# Patient Record
Sex: MALE | Race: WHITE
[De-identification: names, ages, dates, MRNs, and addresses within clinical notes are randomized per-mention and may not be internally consistent; named-entity substitution may affect disease eponyms.]

---

## 2018-09-14 ENCOUNTER — HOSPITAL ENCOUNTER (EMERGENCY)
Dept: HOSPITAL 7 - FB.ED | Age: 43
Discharge: HOME | End: 2018-09-14
Payer: COMMERCIAL

## 2018-09-14 DIAGNOSIS — Z79.899: ICD-10-CM

## 2018-09-14 DIAGNOSIS — J98.01: Primary | ICD-10-CM

## 2018-09-14 DIAGNOSIS — F17.210: ICD-10-CM

## 2018-09-14 RX ADMIN — ALBUTEROL SULFATE ONE MG: 2.5 SOLUTION RESPIRATORY (INHALATION) at 18:53

## 2018-09-19 NOTE — CR
INDICATION:   Shortness of breath.



CHEST, PA AND LATERAL VIEWS:



FINDINGS:  No comparison studies are available.  



The lungs are hyperinflated.  The patient appears to have underlying emphysema.
  



There is some asymmetry that is identified in the appearance of the right 
suprahilar chest, compared to the left side.  This may simply represent normal 
bronchovascular structures.  It is possible that there is a small area of 
pneumonia or atelectasis.  I cannot exclude that there is not an underlying 
mass or adenopathy in this location, however.  I would suggest a CT scan chest 
with IV contrast to further evaluate.



No other definite nodule or mass. 



The heart size and pulmonary vasculature are normal.  



There is a mild scoliosis deformity of the thoracic spine, curved convex right, 
with some degenerative end plate change and spurring.



IMPRESSION:  Almost certain underlying emphysema.



Asymmetry right suprahilar lung, compared to left side.  Again, this may turn 
out to be normal.  I still would do a CT scan of the chest to further evaluate, 
however.  

MTDD

## 2020-01-25 ENCOUNTER — HOSPITAL ENCOUNTER (OUTPATIENT)
Dept: HOSPITAL 52 - LL.ED | Age: 45
Setting detail: OBSERVATION
LOS: 2 days | Discharge: HOME | End: 2020-01-27
Attending: EMERGENCY MEDICINE | Admitting: EMERGENCY MEDICINE
Payer: MEDICAID

## 2020-01-25 DIAGNOSIS — M54.9: ICD-10-CM

## 2020-01-25 DIAGNOSIS — F17.210: ICD-10-CM

## 2020-01-25 DIAGNOSIS — J44.9: ICD-10-CM

## 2020-01-25 DIAGNOSIS — M62.830: Primary | ICD-10-CM

## 2020-01-25 DIAGNOSIS — G89.29: ICD-10-CM

## 2020-01-25 LAB
CHLORIDE SERPL-SCNC: 103 MMOL/L (ref 98–107)
SODIUM SERPL-SCNC: 140 MMOL/L (ref 136–145)

## 2020-01-25 PROCEDURE — G0378 HOSPITAL OBSERVATION PER HR: HCPCS

## 2020-01-25 RX ADMIN — Medication PRN ML: at 23:03

## 2020-01-25 RX ADMIN — Medication PRN ML: at 22:25

## 2020-01-25 NOTE — EDM.PDOC
ED HPI GENERAL MEDICAL PROBLEM





- General


Chief Complaint: Back Pain or Injury


Stated Complaint: flank, abdominal pain


Time Seen by Provider: 01/25/20 21:43


Source of Information: Reports: Patient


History Limitations: Reports: No Limitations





- History of Present Illness


INITIAL COMMENTS - FREE TEXT/NARRATIVE: 





Patient comes to ER with acute back pain/muscle spasms that started yesterday. 

Worsening over the last 24 hours.  Was performing "heavy wrenching" on tractor 

yesterday and feels that may have triggered this pain.  Has had pain in same 

area (left side/under scapula and mid back, radiating around to front of upper 

abdomen) in past but it was located a little higher than this episode.  Has 

history of chronic back pain/intermittent leg numbness that has been worked up 

in past by other providers. No new numbness/arm weakness. 





- Related Data


 Allergies











Allergy/AdvReac Type Severity Reaction Status Date / Time


 


No Known Allergies Allergy   Verified 01/25/20 21:08











Home Meds: 


 Home Meds





Albuterol [Ventolin HFA] 0 puff INH Q4H PRN 01/25/20 [History]











Past Medical History


Respiratory History: Reports: COPD


Musculoskeletal History: Reports: Back Pain, Chronic


Neurological History: Reports: Neuropathy, Peripheral





Social & Family History





- Family History


Family Medical History: Noncontributory





- Tobacco Use


Smoking Status *Q: Current Every Day Smoker


Years of Tobacco use: 28


Packs/Tins Daily: 0.5


Second Hand Smoke Exposure: Yes





- Caffeine Use


Caffeine Use: Reports: Coffee, Soda


Other Caffeine Use: Mt Dew


Caffeine Use Comment: 2L day





- Alcohol Use


Days Per Week of Alcohol Use: 0





- Recreational Drug Use


Recreational Drug Use: No





ED ROS GENERAL





- Review of Systems


Review Of Systems: See Below


Constitutional: Reports: No Symptoms


HEENT: Reports: No Symptoms


Respiratory: Reports: No Symptoms.  Denies: Pleuritic Chest Pain


Cardiovascular: Denies: Chest Pain, Dyspnea on Exertion, Lightheadedness, 

Palpitations


GI/Abdominal: Reports: Other (back pain does wrap around to left upper abdomen)

.  Denies: Constipation, Diarrhea, Nausea, Vomiting


: Reports: No Symptoms


Musculoskeletal: Reports: Back Pain.  Denies: Arm Pain, Leg Pain


Skin: Reports: No Symptoms


Neurological: Reports: No Symptoms (no acute changes)


Psychiatric: Reports: No Symptoms


Hematologic/Lymphatic: Reports: No Symptoms





ED EXAM, GENERAL





- Physical Exam


Exam: See Below


Exam Limited By: Physical Impairment (severe back spasms with movement/

palpation of left mid back.)


General Appearance: Alert, WD/WN, Severe Distress (during spasms)


Eye Exam: Bilateral Eye: EOMI, PERRL


Ears: Hearing Grossly Normal


Nose: No: Nasal Deformity, Nasal Swelling, Nasal Drainage


Throat/Mouth: Normal Lips, Normal Voice, No Airway Compromise


Head: Atraumatic, Normocephalic


Neck: Supple, Non-Tender, Full Range of Motion


Respiratory/Chest: No Respiratory Distress, Lungs Clear, Normal Breath Sounds, 

No Accessory Muscle Use, Other (posterior chest under scapula tender with 

palpation, tender along serratus muscle)


Cardiovascular: No Edema, No Murmur, Tachycardia


GI/Abdominal: Normal Bowel Sounds, Soft, Non-Tender, No Distention


 (Male) Exam: Deferred


Rectal (Males) Exam: Deferred


Back Exam: Paraspinal Tenderness (left back under scapula)


Extremities: Normal Capillary Refill, Other (no gross assymetry noted. )


Neurological: Alert, Oriented, Normal Cognition, Other (appears to have equal 

strength bilaterally upper/lower limbs)


Psychiatric: Anxious


Skin Exam: Warm, Dry, Intact, Normal Color





Course





- Vital Signs


Last Recorded V/S: 





 Last Vital Signs











Temp  36.8 C   01/25/20 21:11


 


Pulse  107 H  01/25/20 21:11


 


Resp  20   01/25/20 21:11


 


BP  131/87   01/25/20 21:11


 


Pulse Ox  96   01/25/20 21:11














- Orders/Labs/Meds


Orders: 





 Active Orders 24 hr











 Category Date Time Status


 


 MAGNESIUM [CHEM] Stat Lab  01/25/20 21:30 Received


 


 Ketorolac [Toradol] Med  01/25/20 21:54 Once





 30 mg IVPUSH ONETIME ONE   


 


 Morphine Med  01/25/20 21:54 Once





 2 mg IVPUSH ONETIME ONE   


 


 Ondansetron [Zofran] Med  01/25/20 21:54 Once





 4 mg IVPUSH ONETIME ONE   


 


 Sodium Chloride 0.9% [Saline Flush] Med  01/25/20 21:53 Ordered





 10 ml FLUSH ASDIRECTED PRN   


 


 Saline Lock Insert [OM.PC] Routine Oth  01/25/20 21:53 Ordered








 Medication Orders





Sodium Chloride (Saline Flush)  10 ml FLUSH ASDIRECTED PRN


   PRN Reason: Keep Vein Open








Labs: 





 Laboratory Tests











  01/25/20 01/25/20 01/25/20 Range/Units





  21:30 21:30 21:30 


 


WBC   10.0   (4.0-10.2)  K/uL


 


RBC   5.21   (4.33-5.41)  M/uL


 


Hgb   15.7   (13.1-16.8)  g/dL


 


Hct   45.6   (39.0-49.0)  %


 


MCV   87.5   (84.0-98.0)  fL


 


MCH   30.1   (28.2-33.3)  pg


 


MCHC   34.4   (31.7-36.0)  g/dL


 


RDW   13.0   (11.2-14.1)  %


 


Plt Count   279   (150-350)  K/uL


 


Neut % (Auto)   51.2   (45.0-80.0)  %


 


Lymph % (Auto)   36.2   (10.0-50.0)  %


 


Mono % (Auto)   8.3   (2.0-14.0)  %


 


Eos % (Auto)   3.9   (0.0-5.0)  %


 


Baso % (Auto)   0.4   (0.0-2.0)  %


 


Neut # (Auto)   5.14   (1.40-7.00)  K/uL


 


Lymph # (Auto)   3.63 H   (0.50-3.50)  K/uL


 


Mono # (Auto)   0.83   (0.00-1.00)  K/uL


 


Eos # (Auto)   0.39   (0.00-0.50)  K/uL


 


Baso # (Auto)   0.04   (0.00-0.20)  K/uL


 


Sodium    140  (136-145)  mmol/L


 


Potassium    4.1  (3.5-5.1)  mmol/L


 


Chloride    103  ()  mmol/L


 


Carbon Dioxide    27.2  (21.0-32.0)  mmol/L


 


BUN    15  (7-18)  mg/dL


 


Creatinine    0.93  (0.51-1.17)  mg/dL


 


Est Cr Clr Drug Dosing    102.48  mL/min


 


Estimated GFR (MDRD)    > 60  mL/min


 


Glucose    127 H  ()  mg/dL


 


Calcium    9.2  (8.5-10.1)  mg/dL


 


Total Bilirubin    0.6  (0.2-1.0)  mg/dL


 


AST    24  (15-37)  U/L


 


ALT    33  (12-78)  U/L


 


Alkaline Phosphatase    81  ()  IU/L


 


Total Protein    8.0  (6.4-8.2)  g/dL


 


Albumin    4.3  (3.4-5.0)  g/dL


 


Specimen Type  Urinvoid    


 


Urine Color  Yellow    


 


Urine Appearance  Clear    


 


Urine pH  5.5    (5.0-9.0)  


 


Ur Specific Gravity  >= 1.030    (1.005-1.030)  


 


Urine Protein  Negative    (NEGATIVE)  mg/dL


 


Urine Glucose (UA)  Negative    (NEGATIVE)  mg/dL


 


Urine Ketones  Trace H    (NEGATIVE)  mg/dL


 


Urine Occult Blood  Negative    (NEGATIVE)  


 


Urine Nitrite  Negative    (NEGATIVE)  


 


Urine Bilirubin  Negative    (NEGATIVE)  


 


Urine Urobilinogen  0.2    (0.2-1.0)  E.U./dL


 


Ur Leukocyte Esterase  Negative    (NEGATIVE)  


 


Urine RBC  Not seen    /HPF


 


Urine WBC  0-5    /HPF


 


Urine Bacteria  Rare    (NONE TO FEW)  /HPF


 


Urine Mucus  Rare H    (NEGATIVE)  /LPF











Meds: 





Medications











Generic Name Dose Route Start Last Admin





  Trade Name Freq  PRN Reason Stop Dose Admin


 


Sodium Chloride  10 ml  01/25/20 21:53  





  Saline Flush  FLUSH   





  ASDIRECTED PRN   





  Keep Vein Open   





     





     





     














Discontinued Medications














Generic Name Dose Route Start Last Admin





  Trade Name Freq  PRN Reason Stop Dose Admin


 


Diazepam  5 mg  01/25/20 21:53  





  Valium.  PO  01/25/20 21:54  





  ONETIME ONE   





     





     





     





     














- Re-Assessments/Exams


Free Text/Narrative Re-Assessment/Exam: 





01/25/20 22:02


CBC/Chem/Mg/UA requested.  Overall unremarkable labs.  Does not appear to be 

consistent with kidney stone at this time.  Does appear to be musculo/skeletal 

in nature given reproducibility with light palpation and spasm-like nature.  

Patient does have history of long standing back issues.  Unable to obtain plain 

films of back at this time due to patient's symptoms. Plan is to admit 

observation for pain and spasm control and assistance with ADLs.





Departure





- Departure


Time of Disposition: 22:05


Disposition: Refer to Observation


Condition: Good


Clinical Impression: 


 Spasm of thoracic back muscle








- Discharge Information


*PRESCRIPTION DRUG MONITORING PROGRAM REVIEWED*: Not Applicable


*COPY OF PRESCRIPTION DRUG MONITORING REPORT IN PATIENT CARMINE: Not Applicable





Sepsis Event Note





- Evaluation


Sepsis Screening Result: No Definite Risk





- Focused Exam


Vital Signs: 





 Vital Signs











  Temp Pulse Resp BP Pulse Ox


 


 01/25/20 21:11  36.8 C  107 H  20  131/87  96











Date Exam was Performed: 01/25/20


Time Exam was Performed: 21:54





- Problem List & Annotations


(1) Spasm of thoracic back muscle


SNOMED Code(s): 897745113904870


   Code(s): M62.830 - MUSCLE SPASM OF BACK   Status: Acute   Priority: High   

Current Visit: Yes   Onset Date: 01/24/20   Annotation/Comment:: Acute spasm of 

back/lower thoracic area with radiation around serratus anterior muscle and 

left upper abdomen.  Will treat pain and spasms. Assistance with ADLs as 

needed. Consider MRI study when it is available on Monday depending on patient'

s clinic course over the next 24-48 hours.    





(2) Chronic back pain


SNOMED Code(s): 281711044


   Code(s): M54.9 - DORSALGIA, UNSPECIFIED; G89.29 - OTHER CHRONIC PAIN   Status

: Chronic   Priority: Low   Current Visit: No   Annotation/Comment:: stable per 

patient   


Qualifiers: 


   Back pain location: low back pain   Back pain laterality: unspecified   

Sciatica presence: unspecified whether sciatica present   Qualified Code(s): 

M54.5 - Low back pain; G89.29 - Other chronic pain   





(3) COPD (chronic obstructive pulmonary disease)


SNOMED Code(s): 35932589


   Code(s): J44.9 - CHRONIC OBSTRUCTIVE PULMONARY DISEASE, UNSPECIFIED   Status

: Chronic   Priority: Low   Current Visit: No   Annotation/Comment:: mild 

asthma per patient with PRN Albuterol inhaler.  Is smoker.    


Qualifiers: 


   COPD type: unspecified COPD   Qualified Code(s): J44.9 - Chronic obstructive 

pulmonary disease, unspecified   





- Problem List Review


Problem List Initiated/Reviewed/Updated: Yes





- My Orders


Last 24 Hours: 





My Active Orders





01/25/20 21:30


MAGNESIUM [CHEM] Stat 





01/25/20 21:53


Sodium Chloride 0.9% [Saline Flush]   10 ml FLUSH ASDIRECTED PRN 


Saline Lock Insert [OM.PC] Routine 





01/25/20 21:54


Ketorolac [Toradol]   30 mg IVPUSH ONETIME ONE 


Morphine   2 mg IVPUSH ONETIME ONE 


Ondansetron [Zofran]   4 mg IVPUSH ONETIME ONE 














- Assessment/Plan


Admission H&P: Please use this note as an admission H&P


Last 24 Hours: 





My Active Orders





01/25/20 21:30


MAGNESIUM [CHEM] Stat 





01/25/20 21:53


Sodium Chloride 0.9% [Saline Flush]   10 ml FLUSH ASDIRECTED PRN 


Saline Lock Insert [OM.PC] Routine 





01/25/20 21:54


Ketorolac [Toradol]   30 mg IVPUSH ONETIME ONE 


Morphine   2 mg IVPUSH ONETIME ONE 


Ondansetron [Zofran]   4 mg IVPUSH ONETIME ONE 











Assessment:: 





as above


Plan: 





as above

## 2020-01-26 RX ADMIN — KETOROLAC TROMETHAMINE PRN MG: 30 INJECTION, SOLUTION INTRAMUSCULAR at 08:50

## 2020-01-26 RX ADMIN — Medication SCH EACH: at 11:29

## 2020-01-26 NOTE — PCM.PN
- General Info


Date of Service: 01/26/20


Admission Dx/Problem (Free Text): 





Acute left sided back pain/spasms that started while patient was working on 

tractor at work. 


Subjective Update: 





Pain severity/spasms still present but overall severity has improved on current 

medical therapy. Still needs assistance with getting up and out of bed.  Unable 

to perform ADLs independently. 


Functional Status: Reports: Tolerating Diet, Ambulating (with assistance).  

Denies: New Symptoms


Pain Score: 3





- Review of Systems


General: Reports: No Symptoms


HEENT: Reports: No Symptoms


Pulmonary: Denies: Shortness of Breath, Pleuritic Chest Pain, Cough, Sputum


Cardiovascular: Reports: Chest Pain (left sided, wraps from back around to 

front.).  Denies: Palpitations, Dyspnea on Exertion, Orthopnea, Lightheadedness


Gastrointestinal: Reports: Abdominal Pain (Pain extends to upper left side 

abdomen when he experiences spasms).  Denies: Constipation, Diarrhea, Nausea, 

Vomiting


Genitourinary: Reports: No Symptoms


Musculoskeletal: Reports: Back Pain.  Denies: Neck Pain, Arm Pain, Leg Pain


Skin: Reports: No Symptoms


Neurological: Denies: Confusion, Dizziness, Numbness, Paresthesia, Weakness


Psychiatric: Reports: No Symptoms





- Patient Data


Vitals - Most Recent: 


 Last Vital Signs











Temp  36.9 C   01/26/20 10:52


 


Pulse  98   01/26/20 10:52


 


Resp  18   01/26/20 10:52


 


BP  113/64   01/26/20 10:52


 


Pulse Ox  93 L  01/26/20 10:52











Weight - Most Recent: 77.111 kg


I&O - Last 24 Hours: 


 Intake & Output











 01/25/20 01/26/20 01/26/20





 22:59 06:59 14:59


 


Intake Total  850 


 


Balance  850 











Lab Results Last 24 Hours: 


 Laboratory Results - last 24 hr











  01/25/20 01/25/20 01/25/20 Range/Units





  21:30 21:30 21:30 


 


WBC   10.0   (4.0-10.2)  K/uL


 


RBC   5.21   (4.33-5.41)  M/uL


 


Hgb   15.7   (13.1-16.8)  g/dL


 


Hct   45.6   (39.0-49.0)  %


 


MCV   87.5   (84.0-98.0)  fL


 


MCH   30.1   (28.2-33.3)  pg


 


MCHC   34.4   (31.7-36.0)  g/dL


 


RDW   13.0   (11.2-14.1)  %


 


Plt Count   279   (150-350)  K/uL


 


Neut % (Auto)   51.2   (45.0-80.0)  %


 


Lymph % (Auto)   36.2   (10.0-50.0)  %


 


Mono % (Auto)   8.3   (2.0-14.0)  %


 


Eos % (Auto)   3.9   (0.0-5.0)  %


 


Baso % (Auto)   0.4   (0.0-2.0)  %


 


Neut # (Auto)   5.14   (1.40-7.00)  K/uL


 


Lymph # (Auto)   3.63 H   (0.50-3.50)  K/uL


 


Mono # (Auto)   0.83   (0.00-1.00)  K/uL


 


Eos # (Auto)   0.39   (0.00-0.50)  K/uL


 


Baso # (Auto)   0.04   (0.00-0.20)  K/uL


 


Sodium    140  (136-145)  mmol/L


 


Potassium    4.1  (3.5-5.1)  mmol/L


 


Chloride    103  ()  mmol/L


 


Carbon Dioxide    27.2  (21.0-32.0)  mmol/L


 


BUN    15  (7-18)  mg/dL


 


Creatinine    0.93  (0.51-1.17)  mg/dL


 


Est Cr Clr Drug Dosing    102.48  mL/min


 


Estimated GFR (MDRD)    > 60  mL/min


 


Glucose    127 H  ()  mg/dL


 


Calcium    9.2  (8.5-10.1)  mg/dL


 


Magnesium     (1.8-2.4)  mg/dL


 


Total Bilirubin    0.6  (0.2-1.0)  mg/dL


 


AST    24  (15-37)  U/L


 


ALT    33  (12-78)  U/L


 


Alkaline Phosphatase    81  ()  IU/L


 


Total Protein    8.0  (6.4-8.2)  g/dL


 


Albumin    4.3  (3.4-5.0)  g/dL


 


Specimen Type  Urinvoid    


 


Urine Color  Yellow    


 


Urine Appearance  Clear    


 


Urine pH  5.5    (5.0-9.0)  


 


Ur Specific Gravity  >= 1.030    (1.005-1.030)  


 


Urine Protein  Negative    (NEGATIVE)  mg/dL


 


Urine Glucose (UA)  Negative    (NEGATIVE)  mg/dL


 


Urine Ketones  Trace H    (NEGATIVE)  mg/dL


 


Urine Occult Blood  Negative    (NEGATIVE)  


 


Urine Nitrite  Negative    (NEGATIVE)  


 


Urine Bilirubin  Negative    (NEGATIVE)  


 


Urine Urobilinogen  0.2    (0.2-1.0)  E.U./dL


 


Ur Leukocyte Esterase  Negative    (NEGATIVE)  


 


Urine RBC  Not seen    /HPF


 


Urine WBC  0-5    /HPF


 


Urine Bacteria  Rare    (NONE TO FEW)  /HPF


 


Urine Mucus  Rare H    (NEGATIVE)  /LPF














  01/25/20 Range/Units





  21:30 


 


WBC   (4.0-10.2)  K/uL


 


RBC   (4.33-5.41)  M/uL


 


Hgb   (13.1-16.8)  g/dL


 


Hct   (39.0-49.0)  %


 


MCV   (84.0-98.0)  fL


 


MCH   (28.2-33.3)  pg


 


MCHC   (31.7-36.0)  g/dL


 


RDW   (11.2-14.1)  %


 


Plt Count   (150-350)  K/uL


 


Neut % (Auto)   (45.0-80.0)  %


 


Lymph % (Auto)   (10.0-50.0)  %


 


Mono % (Auto)   (2.0-14.0)  %


 


Eos % (Auto)   (0.0-5.0)  %


 


Baso % (Auto)   (0.0-2.0)  %


 


Neut # (Auto)   (1.40-7.00)  K/uL


 


Lymph # (Auto)   (0.50-3.50)  K/uL


 


Mono # (Auto)   (0.00-1.00)  K/uL


 


Eos # (Auto)   (0.00-0.50)  K/uL


 


Baso # (Auto)   (0.00-0.20)  K/uL


 


Sodium   (136-145)  mmol/L


 


Potassium   (3.5-5.1)  mmol/L


 


Chloride   ()  mmol/L


 


Carbon Dioxide   (21.0-32.0)  mmol/L


 


BUN   (7-18)  mg/dL


 


Creatinine   (0.51-1.17)  mg/dL


 


Est Cr Clr Drug Dosing   mL/min


 


Estimated GFR (MDRD)   mL/min


 


Glucose   ()  mg/dL


 


Calcium   (8.5-10.1)  mg/dL


 


Magnesium  1.9  (1.8-2.4)  mg/dL


 


Total Bilirubin   (0.2-1.0)  mg/dL


 


AST   (15-37)  U/L


 


ALT   (12-78)  U/L


 


Alkaline Phosphatase   ()  IU/L


 


Total Protein   (6.4-8.2)  g/dL


 


Albumin   (3.4-5.0)  g/dL


 


Specimen Type   


 


Urine Color   


 


Urine Appearance   


 


Urine pH   (5.0-9.0)  


 


Ur Specific Gravity   (1.005-1.030)  


 


Urine Protein   (NEGATIVE)  mg/dL


 


Urine Glucose (UA)   (NEGATIVE)  mg/dL


 


Urine Ketones   (NEGATIVE)  mg/dL


 


Urine Occult Blood   (NEGATIVE)  


 


Urine Nitrite   (NEGATIVE)  


 


Urine Bilirubin   (NEGATIVE)  


 


Urine Urobilinogen   (0.2-1.0)  E.U./dL


 


Ur Leukocyte Esterase   (NEGATIVE)  


 


Urine RBC   /HPF


 


Urine WBC   /HPF


 


Urine Bacteria   (NONE TO FEW)  /HPF


 


Urine Mucus   (NEGATIVE)  /LPF











Med Orders - Current: 


 Current Medications





Acetaminophen (Tylenol Extra Strength)  1,000 mg PO Q6H PRN


   PRN Reason: Pain


Albuterol (Proventil Neb Soln)  2.5 mg NEB Q6HRRT PRN


   PRN Reason: Shortness Of Breath/wheezing


   Last Admin: 01/26/20 10:46 Dose:  2.5 mg


Sodium Chloride (Normal Saline)  1,000 mls @ 125 mls/hr IV ASDIRECTED [facility]


   Last Admin: 01/26/20 08:05 Dose:  125 mls/hr


Ketorolac Tromethamine (Toradol)  30 mg IVPUSH Q6H PRN


   PRN Reason: Pain (moderate 4-6)


   Last Admin: 01/26/20 08:50 Dose:  30 mg


Lidocaine (Aspercreme 4%)  1 each TOP DAILY [facility]


Morphine Sulfate (Morphine)  2 mg IVPUSH Q1H PRN


   PRN Reason: Pain (severe 7-10)


Nicotine (Habitrol)  21 mg TRDERM DAILY [facility]


   Last Admin: 01/26/20 10:02 Dose:  21 mg


Ondansetron HCl (Zofran)  4 mg IVPUSH Q6H PRN


   PRN Reason: Nausea/Vomiting


Sodium Chloride (Saline Flush)  10 ml FLUSH ASDIRECTED PRN


   PRN Reason: Keep Vein Open


   Last Admin: 01/25/20 23:03 Dose:  10 ml





Discontinued Medications





Diazepam (Valium.)  5 mg PO ONETIME ONE


   Stop: 01/25/20 21:54


   Last Admin: 01/25/20 22:00 Dose:  5 mg


Diazepam (Valium.)  5 mg PO ONETIME ONE


   Stop: 01/26/20 08:01


   Last Admin: 01/26/20 08:05 Dose:  5 mg


Magnesium Sulfate/Dextrose 1 (gm/ Premix)  100 mls @ 100 mls/hr IV ONETIME ONE


   Stop: 01/25/20 23:15


   Last Admin: 01/25/20 23:02 Dose:  100 mls/hr


Ketorolac Tromethamine (Toradol)  30 mg IVPUSH ONETIME ONE


   Stop: 01/25/20 21:55


   Last Admin: 01/25/20 22:12 Dose:  30 mg


Methylprednisolone Sodium Succinate (Solu-Medrol)  125 mg IVPUSH ONETIME ONE


   Stop: 01/25/20 22:16


   Last Admin: 01/25/20 23:02 Dose:  125 mg


Morphine Sulfate (Morphine)  2 mg IVPUSH ONETIME ONE


   Stop: 01/25/20 21:55


   Last Admin: 01/25/20 22:08 Dose:  2 mg


Ondansetron HCl (Zofran)  4 mg IVPUSH ONETIME ONE


   Stop: 01/25/20 21:55


   Last Admin: 01/25/20 22:26 Dose:  4 mg











- Exam


Quality Assessment: DVT Prophylaxis


General: Alert, Oriented, Cooperative, Moderate Distress (when spasms triggered)


HEENT: Pupils Equal, Pupils Reactive, EOMI, Mucous Membr. Moist/Pink


Neck: Supple


Lungs: Clear to Auscultation, Normal Respiratory Effort


Cardiovascular: Regular Rate, Regular Rhythm


GI/Abdominal Exam: Normal Bowel Sounds, Soft, Non-Tender, No Distention


 (Male) Exam: Deferred


Back Exam: Other (very tender with even lightest touch mid left back/serratus 

muscle distribution/around side and left lower anterior chest. Reproduces pain 

complaint.  No spinal tenderness with palpation)


Extremities: Normal Capillary Refill, Limited Range of Motion (left arm/

shoulder as it exacerbates pain complaint)


Peripheral Pulses: 2+: Radial (L), Radial (R)


Skin: Warm, Dry.  No: Rash


Neurological: No New Focal Deficit


Psy/Mental Status: Alert, Normal Affect, Normal Mood





Sepsis Event Note





- Evaluation


Sepsis Screening Result: No Definite Risk





- Focused Exam


Vital Signs: 


 Vital Signs











  Temp Temp Pulse Resp BP Pulse Ox


 


 01/26/20 10:52  36.9 C   98  18  113/64  93 L


 


 01/26/20 08:00   36.7 C  88  14  116/66  98











Date Exam was Performed: 01/26/20


Time Exam was Performed: 11:05





- Problem List & Annotations


(1) Spasm of thoracic back muscle


SNOMED Code(s): 755753345939043


   Code(s): M62.830 - MUSCLE SPASM OF BACK   Status: Acute   Priority: High   

Current Visit: Yes   Onset Date: 01/24/20   Annotation/Comment:: Acute spasm of 

back/lower thoracic area with radiation around serratus anterior muscle and 

left upper abdomen.  Initially noted while working on tractor at place of 

employment. Will treat pain and spasms. Assistance with ADLs as needed. 

Consider MRI study when it is available on Monday depending on patient's clinic 

course over the next 24-48 hours.    





(2) Chronic back pain


SNOMED Code(s): 913687911


   Code(s): M54.9 - DORSALGIA, UNSPECIFIED; G89.29 - OTHER CHRONIC PAIN   Status

: Chronic   Priority: Low   Current Visit: No   


Qualifiers: 


   Back pain location: low back pain   Back pain laterality: unspecified   

Sciatica presence: unspecified whether sciatica present   Qualified Code(s): 

M54.5 - Low back pain; G89.29 - Other chronic pain   


Annotation/Comment:: stable per patient   





(3) COPD (chronic obstructive pulmonary disease)


SNOMED Code(s): 84737704


   Code(s): J44.9 - CHRONIC OBSTRUCTIVE PULMONARY DISEASE, UNSPECIFIED   Status

: Chronic   Priority: Low   Current Visit: No   


Qualifiers: 


   COPD type: unspecified COPD   Qualified Code(s): J44.9 - Chronic obstructive 

pulmonary disease, unspecified   


Annotation/Comment:: mild asthma per patient with PRN Albuterol inhaler.  Is 

smoker.    





- Problem List Review


Problem List Initiated/Reviewed/Updated: Yes





- My Orders


Last 24 Hours: 


My Active Orders





01/25/20 21:53


Sodium Chloride 0.9% [Saline Flush]   10 ml FLUSH ASDIRECTED PRN 


Saline Lock Insert [OM.PC] Routine 





01/25/20 22:10


May Shower [RC] ASDIRECTED 


Up With Assistance [RC] ASDIRECTED 


Albuterol [Proventil Neb Soln]   2.5 mg NEB Q6HRRT PRN 


Ketorolac [Toradol]   30 mg IVPUSH Q6H PRN 


Morphine   2 mg IVPUSH Q1H PRN 


Ondansetron [Zofran]   4 mg IVPUSH Q6H PRN 


Resuscitation Status Routine 





01/25/20 22:11


Patient Status [ADT] Routine 


Oxygen Therapy [RC] .PRN 


VTE/DVT Education [RC] PER UNIT ROUTINE 


Vital Signs [RC] Q6HR 





01/25/20 22:12


Pulse Oximetry [RC] .PRN 





01/25/20 22:13


RT Aerosol Therapy [RC] .PRN 





01/25/20 22:15


Acetaminophen [Tylenol Extra Strength]   1,000 mg PO Q6H PRN 





01/25/20 22:30


Sodium Chloride 0.9% [Normal Saline] 1,000 ml IV ASDIRECTED 





01/26/20 10:00


Nicotine [Habitrol]   21 mg TRDERM DAILY 





01/26/20 10:50


Consult to Occupational Therapy [OT Evaluation and Treatment] [CONS] Routine 


PT Evaluation and Treatment [CONS] Routine 





01/26/20 11:15


Lidocaine 4% [Aspercreme 4%]   1 each TOP DAILY 





01/26/20 Breakfast


Regular Diet [DIET] 














- Assessment


Assessment:: 





as above.  Improvement of severity of spasms on current medications.  Still 

unable to perform ADLs independently. 





- Plan


Plan:: 





Continue current treatment plan. Will have PT and OT evaluate patient in AM.  

Consider MRI study tomorrow if appointment slot available to evaluate thoracic 

spine. First report of injury will be filled out given that symptoms started at 

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  Consider 

discharge home tomorrow on muscle relaxants/pain medications if additional 

improvement is noted.

## 2020-01-27 RX ADMIN — KETOROLAC TROMETHAMINE PRN MG: 30 INJECTION, SOLUTION INTRAMUSCULAR at 07:32

## 2020-01-27 RX ADMIN — Medication PRN ML: at 07:33

## 2020-01-27 RX ADMIN — Medication SCH EACH: at 07:41

## 2020-01-27 NOTE — [id number]
**Discharge Summary





- Hospital Course


Brief History: Patient admitted for pain control due to developing sudden new 

severe left sided back/chest pain, spasmodic in pattern.


Diagnosis: Stroke: No





- Discharge Data


Discharge Date: 01/27/20


Discharge Disposition: Home, Self-Care 01


Condition: Good





- Referral to Home Health


Primary Care Physician: 


PCP None








- Discharge Diagnosis/Problem(s)


(1) Spasm of thoracic back muscle


SNOMED Code(s): 765423625388974


   ICD Code: M62.830 - MUSCLE SPASM OF BACK   Status: Acute   Priority: High   

Current Visit: Yes   Onset Date: 01/24/20   Problem Details: Acute spasm of back

/lower thoracic area with radiation around serratus anterior muscle and left 

upper abdomen.  Initially noted while working on tractor at place of 

employment. Improved when given Valium to help with spasma and pain medication. 

Required assistance with ADLs but today was able to work with PT and move more 

independently. No history of back issues involving mid back. Does have history 

of low back pain. MRI of thoracic spine performed today to evaluate for 

degeneration/disc injury that could be linked with presenting complaint.  

Results pending.    





(2) Chronic back pain


SNOMED Code(s): 419307582


   ICD Code: M54.9 - DORSALGIA, UNSPECIFIED; G89.29 - OTHER CHRONIC PAIN   

Status: Chronic   Priority: Low   Current Visit: No   Problem Details: stable 

per patient.  Has had extensive workup in past for this including imaging 

studies per self report.    


Qualifiers: 


   Back pain location: low back pain   Back pain laterality: unspecified   

Sciatica presence: unspecified whether sciatica present   Qualified Code(s): 

M54.5 - Low back pain; G89.29 - Other chronic pain   





(3) COPD (chronic obstructive pulmonary disease)


SNOMED Code(s): 67198334


   ICD Code: J44.9 - CHRONIC OBSTRUCTIVE PULMONARY DISEASE, UNSPECIFIED   Status

: Chronic   Priority: Low   Current Visit: No   Problem Details: mild asthma 

per patient with PRN Albuterol inhaler.  Is smoker. To follow up closely with 

primary provider and get restarted on inhaler to help control baseline 

symptoms.    


Qualifiers: 


   COPD type: unspecified COPD   Qualified Code(s): J44.9 - Chronic obstructive 

pulmonary disease, unspecified   





- Patient Summary/Data


Consults: 


 Consultations





01/26/20 10:50


Consult to Occupational Therapy [OT Evaluation and Treatment] [CONS] Routine 


PT Evaluation and Treatment [CONS] Routine 











Hospital Course: 





Initially patient had severe intermittent spasms of left sided pain in 

distribution from lower 1/2 thoracic area near spine, wrapping around to left 

anterior lower chest/upper abdomen. Triggered by movement and palpation of 

muscles along involved area. No focal spinal tenderness. Initially received 

Valium/pain meds in ER.  Was switched over to Flexeril yesterday on PRN 

schedule.  Significant improvement noted yesterday but patient did continue to 

need some assistance with ADLS due to continued pain flares. He did end up 

declining his Flexeril/Toradol last night and had return of severe spasms this 

morning. These improved again after receiving Toradol and Flexeril. 





Labs/chest xray/Troponin unremarkable. 


MRI of thoracic spine performed today to rule out disc injury/other acute 

changes that may be contributing to patient's pain complaint. He was performing 

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  





Patient evaluated by PT and OT today. Advised on stretches/strategies to help 

with muscle pain. 


Will discharge patient home and continue him on Flexeril/PRN Tramadol.  He can 

take OTC Tylenol or Aleve or Naprosyn as needed.  To follow up in two days at 

clinic (0930 at hospital clinic).   Precautions reviewed.  To follow up 

otherwise as needed if he has sudden worsening problems. 





- Patient Instructions


Diet: Usual Diet as Tolerated


Activity: As Tolerated, No Lifting Over 10 Pounds


Driving: Do Not Drive


Showering/Bathing: May Shower


Notify Provider of: Increased Pain


Other/Special Instructions: Take it easy.  Follow up Wednesday at 9:30 for 

recheck.  Also discuss getting new inhaler. Follow up otherwise as needed if 

symptoms worsen.





- Discharge Plan


*PRESCRIPTION DRUG MONITORING PROGRAM REVIEWED*: Not Applicable


*COPY OF PRESCRIPTION DRUG MONITORING REPORT IN PATIENT CARMINE: Not Applicable


Prescriptions/Med Rec: 


Cyclobenzaprine [Flexeril] 10 mg PO TID PRN #20 tab


 PRN Reason: Spasms


traMADol [Ultram] 50 mg PO Q6H PRN #15 tab


 PRN Reason: Pain


Home Medications: 


 Home Meds





Albuterol [Ventolin HFA] 0 puff INH Q4H PRN 01/25/20 [History]


Cyclobenzaprine [Flexeril] 10 mg PO TID PRN #20 tab 01/27/20 [Rx]


traMADol [Ultram] 50 mg PO Q6H PRN #15 tab 01/27/20 [Rx]








Patient Handouts:  Muscle Cramps and Spasms


Forms:  ED Department Discharge


Referrals: 


Miguelina Bhardwaj NP [Nurse Practitioner] - 





- Discharge Summary/Plan Comment


DC Time >30 min.: No





- General Info


Date of Service: 01/27/20


Admission Dx/Problem (Free Text: 





Acute left sided back pain/spasms that started while patient was working on 

tractor at work. 


Subjective Update: 





Return of spasms/pain this morning. Improved significantly after receiving 

Toradol and Flexeril. Currently back to a "2" rating for discomfort. No spasms 

at this time. Able to perform ADLs today and work with OT/PT.  


Functional Status: Reports: Pain Controlled, Tolerating Diet, Ambulating, 

Urinating.  Denies: New Symptoms





- Review of Systems


General: Reports: No Symptoms


HEENT: Reports: No Symptoms


Pulmonary: Reports: No Symptoms.  Denies: Shortness of Breath, Pleuritic Chest 

Pain, Cough, Sputum, Hemoptysis, Wheezing


Cardiovascular: Reports: Other (left sided chest pain/back pain lower thoracic 

distribution when spasms present)


Gastrointestinal: Reports: Abdominal Pain (pain sometimes has wrapped around to 

left upper abdomen. Did on two occasions have sharp pain also happen right side 

of chest/abdomen. ).  Denies: Constipation, Decreased Appetite, Diarrhea, 

Difficulty Swallowing, Hematochezia, Melena


Genitourinary: Reports: No Symptoms


Musculoskeletal: Reports: Back Pain (left chest as noted above. Has chronic low 

back pain history. Right L-S area irritated this morning. ).  Denies: Arm Pain, 

Leg Pain


Skin: Reports: No Symptoms


Neurological: Reports: No Symptoms.  Denies: Numbness, Paresthesia, Tingling


Psychiatric: Reports: No Symptoms





- Patient Data


Vitals - Most Recent: 


 Last Vital Signs











Temp  36.8 C   01/27/20 08:00


 


Pulse  91   01/27/20 08:00


 


Resp  16   01/27/20 08:00


 


BP  115/67   01/27/20 08:00


 


Pulse Ox  93 L  01/27/20 08:00











Weight - Most Recent: 77.111 kg


I&O - Last 24 hours: 


 Intake & Output











 01/26/20 01/27/20 01/27/20





 22:59 06:59 14:59


 


Intake Total 2339  360


 


Balance 2339  360











Lab Results - Last 24 hrs: 


 Laboratory Results - last 24 hr











  01/26/20 Range/Units





  15:40 


 


D-Dimer, Quantitative  < 100  (0-400)  ng/mL











Med Orders - Current: 


 Current Medications





Acetaminophen (Tylenol Extra Strength)  1,000 mg PO Q6H PRN


   PRN Reason: Pain


   Last Admin: 01/27/20 04:01 Dose:  1,000 mg


Albuterol (Proventil Neb Soln)  2.5 mg NEB Q6HRRT PRN


   PRN Reason: Shortness Of Breath/wheezing


   Last Admin: 01/26/20 10:46 Dose:  2.5 mg


Cyclobenzaprine HCl (Flexeril)  10 mg PO TID [facility]


   Last Admin: 01/27/20 11:46 Dose:  10 mg


Enoxaparin Sodium (Lovenox)  40 mg SUBCUT Q24H [facility]


   Last Admin: 01/27/20 11:46 Dose:  40 mg


Ketorolac Tromethamine (Toradol)  30 mg IVPUSH Q6H PRN


   PRN Reason: Pain (moderate 4-6)


   Last Admin: 01/27/20 07:32 Dose:  30 mg


Lidocaine (Aspercreme 4%)  1 each TOP DAILY [facility]


   Last Admin: 01/27/20 07:41 Dose:  1 each


Magnesium Oxide (Magnesium Oxide)  400 mg PO BID [facility]


   Last Admin: 01/27/20 07:31 Dose:  400 mg


Morphine Sulfate (Morphine)  2 mg IVPUSH Q1H PRN


   PRN Reason: Pain (severe 7-10)


Nicotine (Habitrol)  21 mg TRDERM DAILY [facility]


   Last Admin: 01/27/20 07:41 Dose:  21 mg


Ondansetron HCl (Zofran)  4 mg IVPUSH Q6H PRN


   PRN Reason: Nausea/Vomiting


Sodium Chloride (Saline Flush)  10 ml FLUSH ASDIRECTED PRN


   PRN Reason: Keep Vein Open


   Last Admin: 01/27/20 07:33 Dose:  10 ml





Discontinued Medications





Diazepam (Valium.)  5 mg PO ONETIME ONE


   Stop: 01/25/20 21:54


   Last Admin: 01/25/20 22:00 Dose:  5 mg


Diazepam (Valium.)  5 mg PO ONETIME ONE


   Stop: 01/26/20 08:01


   Last Admin: 01/26/20 08:05 Dose:  5 mg


Magnesium Sulfate/Dextrose 1 (gm/ Premix)  100 mls @ 100 mls/hr IV ONETIME ONE


   Stop: 01/25/20 23:15


   Last Admin: 01/25/20 23:02 Dose:  100 mls/hr


Sodium Chloride (Normal Saline)  1,000 mls @ 125 mls/hr IV ASDIRECTED ABIGAIL


   Last Admin: 01/26/20 08:05 Dose:  125 mls/hr


Ketorolac Tromethamine (Toradol)  30 mg IVPUSH ONETIME ONE


   Stop: 01/25/20 21:55


   Last Admin: 01/25/20 22:12 Dose:  30 mg


Methylprednisolone Sodium Succinate (Solu-Medrol)  125 mg IVPUSH ONETIME ONE


   Stop: 01/25/20 22:16


   Last Admin: 01/25/20 23:02 Dose:  125 mg


Morphine Sulfate (Morphine)  2 mg IVPUSH ONETIME ONE


   Stop: 01/25/20 21:55


   Last Admin: 01/25/20 22:08 Dose:  2 mg


Ondansetron HCl (Zofran)  4 mg IVPUSH ONETIME ONE


   Stop: 01/25/20 21:55


   Last Admin: 01/25/20 22:26 Dose:  4 mg











- Exam


General: Reports: Alert, Oriented, Cooperative, No Acute Distress


HEENT: Reports: Pupils Equal, Pupils Reactive, EOMI, Mucous Membr. Moist/Pink


Neck: Reports: Supple


Lungs: Reports: Normal Respiratory Effort, Wheezing (single wheeze noted left 

chest).  Denies: Decreased Breath Sounds, Crackles, Rales, Rhonchi, Stridor


Cardiovascular: Reports: Regular Rate, Regular Rhythm


GI/Abdominal Exam: Normal Bowel Sounds, Soft, Non-Tender, No Distention


 (Male) Exam: Deferred


Rectal (Males) Exam: Deferred


Back Exam: Reports: Decreased Range of Motion (movement triggers some 

discomfort in area involved with spasm/pain. Moves somewhat slowly when 

changing positions. ), Other (mild tenderness with palpation left sided soft 

tissue lower 1/2 thoracic area.).  Denies: CVA Tenderness (L), CVA Tenderness (R

), Vertebral Tenderness


Extremities: Non-Tender, Normal Capillary Refill


Skin: Reports: Warm, Dry, Intact


Neurological: Reports: No New Focal Deficit


Psy/Mental Status: Reports: Alert, Normal Affect, Normal Mood